# Patient Record
Sex: FEMALE | Employment: UNEMPLOYED | URBAN - METROPOLITAN AREA
[De-identification: names, ages, dates, MRNs, and addresses within clinical notes are randomized per-mention and may not be internally consistent; named-entity substitution may affect disease eponyms.]

---

## 2021-09-13 ENCOUNTER — TELEPHONE (OUTPATIENT)
Dept: INTERNAL MEDICINE CLINIC | Facility: CLINIC | Age: 61
End: 2021-09-13

## 2021-09-13 NOTE — TELEPHONE ENCOUNTER
Pt called and scheduled a new pt appt with  10/1/21- pt said that she had a lot of medical issues and is bipolar and would really like for her records to be reviewed before her appt TU  Also made appt for 60    Thank you

## 2021-09-13 NOTE — TELEPHONE ENCOUNTER
Updated CareEverywhere. Noted pt had IM OV with a Dr. Yefri Aguilar with [facility] on 6/29/21 to establish care. Then IM OV with Dr. Alyce Samuel with [facility] on 4/22/21. Review notes for upcoming 10/1/21 OV.

## 2021-09-14 ENCOUNTER — PATIENT MESSAGE (OUTPATIENT)
Dept: INTERNAL MEDICINE CLINIC | Facility: CLINIC | Age: 61
End: 2021-09-14

## 2021-09-14 NOTE — TELEPHONE ENCOUNTER
From: Sherice Aly  To: Ana Laura Jiménez MD  Sent: 9/14/2021 12:52 AM CDT  Subject: Appointment and charge    I had an appointment 9/9. Unfortunately, I'm bipolar disorder has been giving me difficultly. I lost your information.  I hoped yo

## 2021-10-18 ENCOUNTER — OFFICE VISIT (OUTPATIENT)
Dept: INTERNAL MEDICINE CLINIC | Facility: CLINIC | Age: 61
End: 2021-10-18
Payer: COMMERCIAL

## 2021-10-18 VITALS
SYSTOLIC BLOOD PRESSURE: 100 MMHG | HEART RATE: 60 BPM | DIASTOLIC BLOOD PRESSURE: 70 MMHG | BODY MASS INDEX: 28.11 KG/M2 | WEIGHT: 156.69 LBS | HEIGHT: 62.5 IN | TEMPERATURE: 97 F | RESPIRATION RATE: 12 BRPM

## 2021-10-18 DIAGNOSIS — R23.8 EASY BRUISING: ICD-10-CM

## 2021-10-18 DIAGNOSIS — R93.89 ABNORMAL CHEST X-RAY: ICD-10-CM

## 2021-10-18 DIAGNOSIS — N18.32 STAGE 3B CHRONIC KIDNEY DISEASE (HCC): ICD-10-CM

## 2021-10-18 DIAGNOSIS — N64.4 BREAST PAIN, RIGHT: ICD-10-CM

## 2021-10-18 DIAGNOSIS — E04.2 MULTIPLE THYROID NODULES: ICD-10-CM

## 2021-10-18 DIAGNOSIS — E78.5 HYPERLIPIDEMIA, UNSPECIFIED HYPERLIPIDEMIA TYPE: ICD-10-CM

## 2021-10-18 DIAGNOSIS — J45.20 MILD INTERMITTENT ASTHMA WITHOUT COMPLICATION: ICD-10-CM

## 2021-10-18 DIAGNOSIS — R60.0 BILATERAL LEG EDEMA: Primary | ICD-10-CM

## 2021-10-18 DIAGNOSIS — Z12.31 ENCOUNTER FOR SCREENING MAMMOGRAM FOR BREAST CANCER: ICD-10-CM

## 2021-10-18 DIAGNOSIS — F31.9 BIPOLAR AFFECTIVE DISORDER, REMISSION STATUS UNSPECIFIED (HCC): ICD-10-CM

## 2021-10-18 PROCEDURE — 3074F SYST BP LT 130 MM HG: CPT | Performed by: INTERNAL MEDICINE

## 2021-10-18 PROCEDURE — 3078F DIAST BP <80 MM HG: CPT | Performed by: INTERNAL MEDICINE

## 2021-10-18 PROCEDURE — 99204 OFFICE O/P NEW MOD 45 MIN: CPT | Performed by: INTERNAL MEDICINE

## 2021-10-18 PROCEDURE — 3008F BODY MASS INDEX DOCD: CPT | Performed by: INTERNAL MEDICINE

## 2021-10-18 RX ORDER — ZINC GLUCONATE 100 MG
1 TABLET ORAL DAILY
COMMUNITY

## 2021-10-18 RX ORDER — POLYETHYLENE GLYCOL 3350 17 G/17G
17 POWDER, FOR SOLUTION ORAL
COMMUNITY

## 2021-10-18 RX ORDER — IPRATROPIUM BROMIDE AND ALBUTEROL SULFATE 2.5; .5 MG/3ML; MG/3ML
SOLUTION RESPIRATORY (INHALATION)
COMMUNITY

## 2021-10-18 RX ORDER — FOLIC ACID 1 MG/1
1 TABLET ORAL DAILY
COMMUNITY

## 2021-10-18 RX ORDER — ESTRADIOL 0.1 MG/D
FILM, EXTENDED RELEASE TRANSDERMAL
COMMUNITY

## 2021-10-18 RX ORDER — PROPRANOLOL HYDROCHLORIDE 10 MG/1
10 TABLET ORAL
COMMUNITY
Start: 2021-10-06

## 2021-10-18 RX ORDER — LISDEXAMFETAMINE DIMESYLATE 40 MG/1
40 CAPSULE ORAL EVERY MORNING
COMMUNITY
Start: 2021-09-09

## 2021-10-18 RX ORDER — DIPHENHYDRAMINE HYDROCHLORIDE 25 MG/1
1 TABLET ORAL DAILY
COMMUNITY

## 2021-10-18 RX ORDER — PROPRANOLOL HYDROCHLORIDE 120 MG/1
120 CAPSULE, EXTENDED RELEASE ORAL DAILY
COMMUNITY
Start: 2021-09-28

## 2021-10-18 RX ORDER — CLONAZEPAM 0.5 MG/1
TABLET ORAL
COMMUNITY
Start: 2021-06-17

## 2021-10-18 NOTE — PROGRESS NOTES
[facility]    CHIEF COMPLAINT:  Patient presents with:  Swelling: generalized swelling. Test Results: review test results regarding kidneys.          HISTORY OF PRESENT ILLNESS:  The patient is a 63year old year old female who presents with mul Medical History:   Diagnosis Date   • Asthma    • Bipolar affective ([facility] [address]    • MVP (mitral valve prolapse)         Past Surgical History:   Past Surgical History:   Procedure Laterality Date   • CORRECT BUNION,OTHR METHODS Bilateral    • OTHER      Bladder Oral Tab Take 25 mg by mouth nightly as needed for Itching (2-4 TABLETS). • Cetirizine HCl 10 MG Oral Cap Take by mouth as needed. • MAGNESIUM OR Take 200 mg by mouth daily.      • Ascorbic Acid (VITAMIN C) 1000 MG Oral Tab Take 1,000 mg by mouth da Alcohol use: Yes        Comment: occasional       Drug use: No      Sexual activity: Not on file    Other Topics      Concerns:        Caffeine Concern: Not Asked        Exercise: Not Asked        Seat Belt: Not Asked        Special Diet: Not Asked denies chest pain on exertion  GI: denies abdominal pain,denies heartburn  : denies dysuria, vaginal discharge or itching  MUSCULOSKELETAL: denies back pain  NEURO: denies headaches  PSYCHE: denies depression or anxiety  HEMATOLOGIC: denies hx of anemia 07:59 AM    BILT 0.2 07/03/2020 07:59 AM    TSH 1.40 04/23/2018 03:33 PM        No results found for: CHOLEST, HDL, TRIG, LDL, NONHDLC    No results found for: A1C   Vitamin D:    No results found for: VITD          ASSESSMENT AND PLAN:   1.  Bilateral leg

## 2021-10-19 ENCOUNTER — TELEPHONE (OUTPATIENT)
Dept: INTERNAL MEDICINE CLINIC | Facility: CLINIC | Age: 61
End: 2021-10-19

## 2021-10-19 ENCOUNTER — MED REC SCAN ONLY (OUTPATIENT)
Dept: INTERNAL MEDICINE CLINIC | Facility: CLINIC | Age: 61
End: 2021-10-19

## 2021-10-19 NOTE — TELEPHONE ENCOUNTER
LVM for pt to call the office back. Lab orders faxed to [facility] in [place] as requested by the patient. Confirmation received.    Faxed to [phone]  Phone number- [phone]

## 2021-11-03 ENCOUNTER — TELEPHONE (OUTPATIENT)
Dept: INTERNAL MEDICINE CLINIC | Facility: CLINIC | Age: 61
End: 2021-11-03

## 2021-11-03 NOTE — TELEPHONE ENCOUNTER
Pt would like to have a mammogram sent to [facility] in [place], Pt stated that we have to call in the order at  [phone].    Please contact Pt when sent  Thank you

## 2021-11-12 ENCOUNTER — TELEPHONE (OUTPATIENT)
Dept: INTERNAL MEDICINE CLINIC | Facility: CLINIC | Age: 61
End: 2021-11-12

## 2021-11-12 NOTE — TELEPHONE ENCOUNTER
Spoke with pt   Gave central scheduling number to schedule with [facility]  Pt v/u  Advise to follow up with her Nephrology for US of Kidney  Per OV notes on 10/18/21  Pt v/u  Pt confirmed she see Dr. Benjamin Barreto for chronic kidney disease

## 2021-11-12 NOTE — TELEPHONE ENCOUNTER
Pt called and said that she has a mammogram scheduled for 12/8/21. Pt was wondering if she should try to get into someone else sooner. Pt stated that breast pain is down from a 9 to a 2.    Pt also thought she remembered that Dr Nyra Leventhal talked about doing a ki

## 2021-11-14 ENCOUNTER — PATIENT MESSAGE (OUTPATIENT)
Dept: INTERNAL MEDICINE CLINIC | Facility: CLINIC | Age: 61
End: 2021-11-14

## 2021-11-14 ENCOUNTER — MOBILE ENCOUNTER (OUTPATIENT)
Dept: INTERNAL MEDICINE CLINIC | Facility: CLINIC | Age: 61
End: 2021-11-14

## 2021-11-14 NOTE — PROGRESS NOTES
Pt took macrobid for the past 10 days for UTI- her symptoms recurred just today- but had missed 5 doses. severe distressing urgency and pressure but hasn’t urinated since she woke up.  She has had recurrent UTIs but has never felt like this  Denies fever, c

## 2021-11-16 NOTE — TELEPHONE ENCOUNTER
From: Boyd Aly  To: Judson Patel MD  Sent: 11/14/2021 4:15 PM CST  Subject: I need to speak to you urgently how do I do that?     I have a UT been on antibiotics for about 5 days symptoms have returned in terrible pain

## 2021-11-16 NOTE — TELEPHONE ENCOUNTER
Called pt to schedule an appt to follow up in clinic if she is still having s/s. She stated she went to her other MD and they switched her abx. I advised is s/s continue after being on the new medication to call us.  Pt stated understanding and has no furth

## 2021-11-29 ENCOUNTER — TELEPHONE (OUTPATIENT)
Dept: INTERNAL MEDICINE CLINIC | Facility: CLINIC | Age: 61
End: 2021-11-29

## 2021-11-29 NOTE — TELEPHONE ENCOUNTER
Pt cancelled her appt with Dr Fely James for 11-29-21. States she will not be returning to this office due to unprofessional behavior of physicians and staff. Pt states she is working with [facility] to find a pcp with a more professional office.   PCP removal starte

## 2022-01-25 ENCOUNTER — TELEPHONE (OUTPATIENT)
Dept: INTERNAL MEDICINE CLINIC | Facility: CLINIC | Age: 62
End: 2022-01-25

## 2022-01-25 NOTE — TELEPHONE ENCOUNTER
Pt called, very upset with our office. States she called months ago to have Dr Luis Mcgarry removed as her pcp and she is still getting messages on her my chart that she has outstanding labs from Dr Luis Mcgarry.  Pt does not want to receive any more messages from our [facility]

## 2022-01-26 NOTE — TELEPHONE ENCOUNTER
Noted.    Noted MA had sent a letter in error yesterday originating from [address] [place] [address] Results basket.

## 2022-01-31 ENCOUNTER — PATIENT MESSAGE (OUTPATIENT)
Dept: INTERNAL MEDICINE CLINIC | Facility: CLINIC | Age: 62
End: 2022-01-31

## 2022-02-02 NOTE — TELEPHONE ENCOUNTER
Message sent to James Bocanegra regarding pts behavior in office on 1/31/22 as well as message sent through [employer]. Letter will be sent to pt by James Bocanegra regarding behavior and she will possibly be discharge from our location. No further action needed.

## 2023-06-26 ENCOUNTER — ANESTHESIA EVENT (OUTPATIENT)
Dept: SURGERY | Facility: HOSPITAL | Age: 63
End: 2023-06-26
Payer: COMMERCIAL

## 2023-06-27 ENCOUNTER — HOSPITAL ENCOUNTER (OUTPATIENT)
Facility: HOSPITAL | Age: 63
Setting detail: HOSPITAL OUTPATIENT SURGERY
Discharge: HOME | End: 2023-06-27
Attending: OBSTETRICS & GYNECOLOGY | Admitting: OBSTETRICS & GYNECOLOGY
Payer: COMMERCIAL

## 2023-06-27 ENCOUNTER — ANESTHESIA (OUTPATIENT)
Dept: SURGERY | Facility: HOSPITAL | Age: 63
End: 2023-06-27
Payer: COMMERCIAL

## 2023-06-27 VITALS
HEIGHT: 62 IN | HEART RATE: 61 BPM | DIASTOLIC BLOOD PRESSURE: 65 MMHG | TEMPERATURE: 98 F | WEIGHT: 153 LBS | SYSTOLIC BLOOD PRESSURE: 104 MMHG | RESPIRATION RATE: 11 BRPM | OXYGEN SATURATION: 96 % | BODY MASS INDEX: 28.16 KG/M2

## 2023-06-27 PROCEDURE — 0U5MXZZ DESTRUCTION OF VULVA, EXTERNAL APPROACH: ICD-10-PCS | Performed by: OBSTETRICS & GYNECOLOGY

## 2023-06-27 RX ORDER — ACETAMINOPHEN 500 MG
1000 TABLET ORAL ONCE
Status: DISCONTINUED | OUTPATIENT
Start: 2023-06-27 | End: 2023-06-27 | Stop reason: HOSPADM

## 2023-06-27 RX ORDER — HYDROMORPHONE HYDROCHLORIDE 1 MG/ML
0.6 INJECTION, SOLUTION INTRAMUSCULAR; INTRAVENOUS; SUBCUTANEOUS EVERY 5 MIN PRN
Status: DISCONTINUED | OUTPATIENT
Start: 2023-06-27 | End: 2023-06-27

## 2023-06-27 RX ORDER — ACETAMINOPHEN 500 MG
1000 TABLET ORAL ONCE AS NEEDED
Status: COMPLETED | OUTPATIENT
Start: 2023-06-27 | End: 2023-06-27

## 2023-06-27 RX ORDER — HYDROCODONE BITARTRATE AND ACETAMINOPHEN 5; 325 MG/1; MG/1
1 TABLET ORAL EVERY 6 HOURS PRN
Qty: 15 TABLET | Refills: 0 | Status: SHIPPED | OUTPATIENT
Start: 2023-06-27

## 2023-06-27 RX ORDER — METHYLPHENIDATE HYDROCHLORIDE 20 MG/1
TABLET ORAL
COMMUNITY

## 2023-06-27 RX ORDER — HYDROCODONE BITARTRATE AND ACETAMINOPHEN 5; 325 MG/1; MG/1
1 TABLET ORAL ONCE AS NEEDED
Status: COMPLETED | OUTPATIENT
Start: 2023-06-27 | End: 2023-06-27

## 2023-06-27 RX ORDER — MIDAZOLAM HYDROCHLORIDE 1 MG/ML
1 INJECTION INTRAMUSCULAR; INTRAVENOUS EVERY 5 MIN PRN
Status: DISCONTINUED | OUTPATIENT
Start: 2023-06-27 | End: 2023-06-27

## 2023-06-27 RX ORDER — METHYLPHENIDATE HYDROCHLORIDE 54 MG/1
54 TABLET ORAL NIGHTLY
COMMUNITY

## 2023-06-27 RX ORDER — ONDANSETRON 2 MG/ML
INJECTION INTRAMUSCULAR; INTRAVENOUS AS NEEDED
Status: DISCONTINUED | OUTPATIENT
Start: 2023-06-27 | End: 2023-06-27 | Stop reason: SURG

## 2023-06-27 RX ORDER — EPHEDRINE SULFATE 50 MG/ML
INJECTION INTRAVENOUS
Status: COMPLETED
Start: 2023-06-27 | End: 2023-06-27

## 2023-06-27 RX ORDER — ONDANSETRON 2 MG/ML
4 INJECTION INTRAMUSCULAR; INTRAVENOUS EVERY 6 HOURS PRN
Status: DISCONTINUED | OUTPATIENT
Start: 2023-06-27 | End: 2023-06-27

## 2023-06-27 RX ORDER — SODIUM CHLORIDE, SODIUM LACTATE, POTASSIUM CHLORIDE, CALCIUM CHLORIDE 600; 310; 30; 20 MG/100ML; MG/100ML; MG/100ML; MG/100ML
INJECTION, SOLUTION INTRAVENOUS CONTINUOUS
Status: DISCONTINUED | OUTPATIENT
Start: 2023-06-27 | End: 2023-06-27

## 2023-06-27 RX ORDER — HYDROCODONE BITARTRATE AND ACETAMINOPHEN 5; 325 MG/1; MG/1
1 TABLET ORAL EVERY 6 HOURS PRN
Status: DISCONTINUED | OUTPATIENT
Start: 2023-06-27 | End: 2023-06-27

## 2023-06-27 RX ORDER — PRAZOSIN HYDROCHLORIDE 5 MG/1
CAPSULE ORAL AS DIRECTED
COMMUNITY
Start: 2023-01-10

## 2023-06-27 RX ORDER — LIDOCAINE HYDROCHLORIDE 10 MG/ML
INJECTION, SOLUTION EPIDURAL; INFILTRATION; INTRACAUDAL; PERINEURAL AS NEEDED
Status: DISCONTINUED | OUTPATIENT
Start: 2023-06-27 | End: 2023-06-27 | Stop reason: SURG

## 2023-06-27 RX ORDER — NALOXONE HYDROCHLORIDE 0.4 MG/ML
80 INJECTION, SOLUTION INTRAMUSCULAR; INTRAVENOUS; SUBCUTANEOUS AS NEEDED
Status: DISCONTINUED | OUTPATIENT
Start: 2023-06-27 | End: 2023-06-27

## 2023-06-27 RX ORDER — PROCHLORPERAZINE EDISYLATE 5 MG/ML
5 INJECTION INTRAMUSCULAR; INTRAVENOUS EVERY 8 HOURS PRN
Status: DISCONTINUED | OUTPATIENT
Start: 2023-06-27 | End: 2023-06-27

## 2023-06-27 RX ORDER — HYDROCODONE BITARTRATE AND ACETAMINOPHEN 5; 325 MG/1; MG/1
2 TABLET ORAL ONCE AS NEEDED
Status: COMPLETED | OUTPATIENT
Start: 2023-06-27 | End: 2023-06-27

## 2023-06-27 RX ORDER — HYDROMORPHONE HYDROCHLORIDE 1 MG/ML
0.4 INJECTION, SOLUTION INTRAMUSCULAR; INTRAVENOUS; SUBCUTANEOUS EVERY 5 MIN PRN
Status: DISCONTINUED | OUTPATIENT
Start: 2023-06-27 | End: 2023-06-27

## 2023-06-27 RX ORDER — HYDROMORPHONE HYDROCHLORIDE 1 MG/ML
0.2 INJECTION, SOLUTION INTRAMUSCULAR; INTRAVENOUS; SUBCUTANEOUS EVERY 5 MIN PRN
Status: DISCONTINUED | OUTPATIENT
Start: 2023-06-27 | End: 2023-06-27

## 2023-06-27 RX ORDER — DEXAMETHASONE SODIUM PHOSPHATE 4 MG/ML
VIAL (ML) INJECTION AS NEEDED
Status: DISCONTINUED | OUTPATIENT
Start: 2023-06-27 | End: 2023-06-27 | Stop reason: SURG

## 2023-06-27 RX ORDER — EPHEDRINE SULFATE 50 MG/ML
10 INJECTION INTRAVENOUS ONCE
Status: COMPLETED | OUTPATIENT
Start: 2023-06-27 | End: 2023-06-27

## 2023-06-27 RX ORDER — CONJUGATED ESTROGENS/BAZEDOXIFENE .45; 2 MG/1; MG/1
TABLET, FILM COATED ORAL
COMMUNITY

## 2023-06-27 RX ORDER — HYDROMORPHONE HYDROCHLORIDE 1 MG/ML
INJECTION, SOLUTION INTRAMUSCULAR; INTRAVENOUS; SUBCUTANEOUS
Status: COMPLETED
Start: 2023-06-27 | End: 2023-06-27

## 2023-06-27 RX ORDER — ALBUTEROL SULFATE 90 UG/1
AEROSOL, METERED RESPIRATORY (INHALATION) AS NEEDED
Status: DISCONTINUED | OUTPATIENT
Start: 2023-06-27 | End: 2023-06-27 | Stop reason: SURG

## 2023-06-27 RX ADMIN — SODIUM CHLORIDE, SODIUM LACTATE, POTASSIUM CHLORIDE, CALCIUM CHLORIDE: 600; 310; 30; 20 INJECTION, SOLUTION INTRAVENOUS at 10:46:00

## 2023-06-27 RX ADMIN — ALBUTEROL SULFATE 2 PUFF: 90 AEROSOL, METERED RESPIRATORY (INHALATION) at 10:32:00

## 2023-06-27 RX ADMIN — ONDANSETRON 4 MG: 2 INJECTION INTRAMUSCULAR; INTRAVENOUS at 10:46:00

## 2023-06-27 RX ADMIN — DEXAMETHASONE SODIUM PHOSPHATE 8 MG: 4 MG/ML VIAL (ML) INJECTION at 10:39:00

## 2023-06-27 RX ADMIN — SODIUM CHLORIDE, SODIUM LACTATE, POTASSIUM CHLORIDE, CALCIUM CHLORIDE: 600; 310; 30; 20 INJECTION, SOLUTION INTRAVENOUS at 10:32:00

## 2023-06-27 RX ADMIN — LIDOCAINE HYDROCHLORIDE 50 MG: 10 INJECTION, SOLUTION EPIDURAL; INFILTRATION; INTRACAUDAL; PERINEURAL at 10:36:00

## 2023-06-27 NOTE — PROGRESS NOTES
[facility]  Brief Op Note    [facility] Location: OR   ABILIO [id number] MRN [id number]   Admission Date 6/27/2023 Operation Date 6/27/2023   Attending Physician Nicanor Tinajero MD Operating Physician Shukri Beaver MD     Pre-Operative Diagnosis:    Post-Operative Diagnosis:     Procedure Performed:     Assistant:     Anesthesia: General    EBL:     Summary of Case:     Shukri Beaver MD  6/27/2023  11:00 [facility]  Brief Op Note    [facility] Location: OR   [facility] [id number] MRN [id number]   Admission Date 6/27/2023 Operation Date 6/27/2023   Attending Physician Nicanor Tinajero MD Operating Physician Shukri Beaver MD     Pre-Operative Diagnosis: Vulvar carcinoma in situ    Post-Operative Diagnosis: Same    Procedure Performed: Laser vaporization of the vulva    Assistant: None    Anesthesia: General    EBL: None    Summary of Case: No complications    Shukri Beaver MD  6/27/2023  11:00 AM

## 2023-06-27 NOTE — H&P
[address]    PATIENT'S NAME: Ashley Hooper   ATTENDING PHYSICIAN: Gwendloyn Gowers, M.D. PATIENT ACCOUNT#:   [de-identified]    LOCATION:    MEDICAL RECORD #:   [id number]       YOB: 1960  ADMISSION DATE:       06/27/2023    HISTORY AND PHYSICAL EXAMINATION    HISTORY OF PRESENT ILLNESS:  The patient is a 57-year-old with abnormal lesion on the vulva, seen by primary OB/GYN in the office and biopsy showed this to be carcinoma in situ of the vulva, when she was seen in gynecologic/oncology clinic and recommended surgery. PAST MEDICAL HISTORY:  No history of diabetes, asthma, hypertension, cardiac problem. PAST SURGICAL HISTORY:  Had hysterectomy for fibroids, sling procedure for bladder, and bunion surgery. SOCIAL HISTORY:  Does not smoke or drink. OBSTETRICAL HISTORY:  No children. FAMILY HISTORY:  Mother had pancreatic cancer. No history of breast, ovary, colon cancer. REVIEW OF SYSTEMS:  Reports no history of shortness of breath, coughing, wheezing, bloating, diarrhea, constipation. Patient has history of being bipolar and anxiety and depression. PHYSICAL EXAMINATION:    GENERAL:  Patient alert and oriented. VITAL SIGNS:  Stable. NECK:  No masses. LUNGS:  Clear bilaterally. HEART:  Heart tones are normal.  ABDOMEN:  Soft, nontender. Liver and spleen not palpable. There are no palpable hernias or masses. PELVIC:  External genitalia examination revealed about 1 cm thick white lesion on the left labia minora. Vagina normal.  Cervix and uterus are missing surgically. EXTREMITIES:  No edema of feet. No DVT. IMPRESSION:  Adenocarcinoma in situ of the vulva. PLAN:  Laser vaporization of the vulva.      Dictated By Gwendloyn Gowers, M.D.  d: 06/26/2023 16:05:14  t: 06/26/2023 16:13:38  Job [id number]  /

## 2023-06-29 NOTE — OPERATIVE REPORT
[address]    PATIENT'S NAME: Parish Wooten   ATTENDING PHYSICIAN: Nika Hernandez M.D. OPERATING PHYSICIAN: Nika Hernandez M.D. PATIENT ACCOUNT#:   [de-identified]    LOCATION:  [facility]  PRE [facility] 24 EDWP 10  MEDICAL RECORD #:   [id number]       YOB: 1960  ADMISSION DATE:       06/27/2023      OPERATION DATE:  06/27/2023    OPERATIVE REPORT    PREOPERATIVE DIAGNOSIS:  Carcinoma in situ of the vulva. POSTOPERATIVE DIAGNOSIS:  Carcinoma in situ of the vulva. PROCEDURE:  Laser vaporization of the vulva. INDICATIONS:  The patient is a 58-year-old found to have a lesion on the left mid vulva, and biopsy showed this to be carcinoma in situ, when she was recommended surgery. OPERATIVE TECHNIQUE:  Patient was placed on table, prepped and draped in usual manner in lithotomy position. A colposcopic examination of the vulva was performed. A solitary lesion on the left vulva involving the posterior end of the labia minora and part of the labia majora was identified. The laser was set at 15 peck continuous mode, and vaporization of the area into the third layer of the skin was performed without any complication. There was no bleeding. At the end of the procedure, the wound was covered with Silvadene cream, and patient removed from lithotomy position.      Dictated By Nika Hernandez M.D.  d: 06/28/2023 15:44:35  t: 06/28/2023 16:10:00  Carmela Cantu [id number]  EW/

## (undated) DEVICE — SMOKE EVACUATION TUBING 7/8 IN (22 MM) X 10 FT (3.1 M) TUBE WITH 8 IN (20 CM) INTEGRAL WAND & SPONGE GUARD: Brand: CONMED BUFFALO FILTER

## (undated) DEVICE — COVER,TABLE,44X90,STERILE: Brand: MEDLINE

## (undated) DEVICE — STERILE WATER 1000ML BTL

## (undated) DEVICE — STERILE SYNTHETIC POLYISOPRENE POWDER-FREE SURGICAL GLOVES WITH HYDROGEL COATING: Brand: PROTEXIS

## (undated) DEVICE — TOWEL SURG OR 17X30IN BLUE

## (undated) DEVICE — SLEEVE KENDALL SCD EXPRESS MED

## (undated) DEVICE — Device

## (undated) NOTE — LETTER
01/25/22    [place] [id number]    Dear Marisel Gutierrez,    [address] records indicate that you have outstanding lab work. To schedule please call your lab of choice or visit their website.  Please request your results to be f